# Patient Record
Sex: MALE | Race: WHITE
[De-identification: names, ages, dates, MRNs, and addresses within clinical notes are randomized per-mention and may not be internally consistent; named-entity substitution may affect disease eponyms.]

---

## 2022-04-13 ENCOUNTER — HOSPITAL ENCOUNTER (OUTPATIENT)
Dept: HOSPITAL 47 - RADCTMAIN | Age: 62
Discharge: HOME | End: 2022-04-13
Attending: UROLOGY
Payer: COMMERCIAL

## 2022-04-13 DIAGNOSIS — N28.89: ICD-10-CM

## 2022-04-13 DIAGNOSIS — N40.0: ICD-10-CM

## 2022-04-13 DIAGNOSIS — N28.1: Primary | ICD-10-CM

## 2022-04-13 PROCEDURE — 74400 UROGRAPHY IV +-KUB TOMOG: CPT

## 2022-04-13 PROCEDURE — 74178 CT ABD&PLV WO CNTR FLWD CNTR: CPT

## 2022-04-13 NOTE — CT
EXAMINATION TYPE: CT urogram wo/w con

 

DATE OF EXAM: 4/13/2022

 

INDICATION: hematuria

 

CT DLP: 1547.20 mGy.cm  Automated Exposure Control for Dose Reduction was Utilized.

 

TECHNIQUE AND CONTRAST: 

CT scan of the abdomen and pelvis is performed without and with IV Contrast, as per CT urogram protoc
ol. The patient was injected with 100 mL of Isovue 300. 3-D reconstruction images were generated on a
n independent workstation and reviewed.

 

COMPARISON: None available

 

FINDINGS:

No radiodense urinary calculi. No hydroureter or hydronephrosis. 15 mm cyst is seen at the upper pole
 of right kidney with internal septation/density within. Recommend follow-up CT scan in 6 months. Unr
emarkable kidneys otherwise. No definite filling defect within the renal collecting system or the ure
ters. No gross ureteric lesion.

 

Markedly enlarged heterogeneous prostate indenting and elevating the urinary bladder base, please cor
relate with PSA level. Focal nodule is seen at the left side of the urinary bladder base measuring 6 
x 8 mm which could represent a blood clot however urothelial lesion cannot be excluded. Recommend cor
relation with urinalysis results (including cytology) and cystoscopy. No other definite urinary bladd
er lesion.

 

A few millimetric hepatic hypodensities, possibly representing hepatic cysts. Nondistended gallbladde
r, possibly due to incomplete fasting. Unremarkable spleen and pancreas. Right adrenal lesion measuri
ng up to 2.3 cm likely representing an adrenal adenoma. Focal thickening of the left adrenal which co
uld also represent an adrenal adenoma. Scattered arterial atherosclerotic calcifications.

 

Unremarkable nondistended stomach, duodenum and small bowel. Wall thickening of the colonic splenic f
lexure and other segments of the colon, nonspecific, please correlate with colonoscopy results. No medel
spicious lymphadenopathy or sizable ascites. Unremarkable lung bases. Marked degenerative changes at 
L5-S1. No gross aggressive bone lesion.

 

IMPRESSION:

1. Right upper pole complex renal cyst as described above, for 6 months follow-up CT scan or further 
ultrasound assessment.

2. Markedly enlarged prostate, please correlate with PSA level.

3. Small nodule in the left urinary bladder base as described above, which could represent a blood cl
ot however urothelial lesion cannot be excluded, please correlate with urinalysis results (including 
cytology) and cystoscopy results. Other multiple incidental findings as detailed above.

## 2022-10-12 ENCOUNTER — HOSPITAL ENCOUNTER (OUTPATIENT)
Dept: HOSPITAL 47 - RADCTMAIN | Age: 62
Discharge: HOME | End: 2022-10-12
Attending: UROLOGY
Payer: COMMERCIAL

## 2022-10-12 DIAGNOSIS — N28.1: ICD-10-CM

## 2022-10-12 DIAGNOSIS — D41.01: Primary | ICD-10-CM

## 2022-10-12 LAB — BUN SERPL-SCNC: 24 MG/DL (ref 9–20)

## 2022-10-12 PROCEDURE — 36415 COLL VENOUS BLD VENIPUNCTURE: CPT

## 2022-10-12 PROCEDURE — 84520 ASSAY OF UREA NITROGEN: CPT

## 2022-10-12 PROCEDURE — 74170 CT ABD WO CNTRST FLWD CNTRST: CPT

## 2022-10-12 PROCEDURE — 82565 ASSAY OF CREATININE: CPT

## 2022-10-13 NOTE — CT
EXAMINATION TYPE: CT abdomen wo/w con

CT DLP: 1153 mGycm, Automated exposure control for dose reduction was used.

 

DATE OF EXAM: 10/12/2022 4:39 PM

 

COMPARISON: CT abdomen pelvis most recent from 4/13/2022

 

CLINICAL INDICATION:Male, 62 years old with history of D41.01; right renal mass

 

TECHNIQUE:  Axial CT of the abdomen. Sagittal and coronal reformats were created on a separate workst
ation. 

 

Contrast used:70 mL of Isovue 300 with IV Contrast, 

Oral contrast used: with Oral Contrast 

 

FINDINGS: 

LOWER CHEST: Chest wall intramuscular lipoma measuring up to 23 mm on the right.

 

ABDOMEN

LIVER: Unremarkable

GALLBLADDER AND BILE DUCTS: Unremarkable.

PANCREAS: Unremarkable.

SPLEEN: Unremarkable.

ADRENAL GLANDS: Unremarkable.

KIDNEYS AND URETERS: A cyst in question in the right upper kidney measures 14 mm which is similar to 
prior given differences in measuring technique. No significant change from prior. There remains a pos
sible thin internal septation. No additional solid masses definitively identified. No evidence of hyd
ronephrosis or renal calculus. 

 

PELVIS

BLADDER: Unremarkable

REPRODUCTIVE: Unremarkable.

 

ABDOMEN & PELVIS

STOMACH AND BOWEL: No evidence of bowel obstruction. 

PERITONEUM: No evidence of pneumoperitoneum or free fluid.

 

VASCULATURE: No evidence of aortic aneurysm. Atherosclerosis of the arterial vasculature.

MUSCULOSKELETAL: No acute osseous abnormalities, multilevel disc degeneration changes are seen throug
hout the spine most pronounced at L4-L5 with endplate spurring, vacuum disc phenomenon and endplate s
clerosis.

LYMPH NODES: No gross evidence for lymphadenopathy.

SOFT TISSUE/ABDOMINAL WALL: Unremarkable

 

IMPRESSION:

Stable right upper pole renal cyst with possible thin internal septation versus 2 adjacent cysts. Fol
low-up in 6 months is again recommended.

## 2023-04-22 ENCOUNTER — HOSPITAL ENCOUNTER (OUTPATIENT)
Dept: HOSPITAL 47 - RADMRIMAIN | Age: 63
Discharge: HOME | End: 2023-04-22
Attending: UROLOGY
Payer: COMMERCIAL

## 2023-04-22 DIAGNOSIS — N40.0: Primary | ICD-10-CM

## 2023-04-22 DIAGNOSIS — R97.20: ICD-10-CM

## 2023-04-22 PROCEDURE — 72197 MRI PELVIS W/O & W/DYE: CPT

## 2023-04-23 NOTE — MR
EXAMINATION TYPE: MR Prostate wo/w con

 

DATE OF EXAM: 4/22/2023 8:11 AM

 

COMPARISON: CT 12/12/2022.  

 

CLINICAL INDICATION:Male, 63 years old with history of R97.20 ELEVATED PROSTATE SPECIFIC ANTIGEN; 

 

TECHNIQUE: Multi-planar, multi-sequence imaging of the pelvis is performed prior to and following the
 uncomplicated administration of bolus intravenous gadolinium.

 

CONTRAST: 8.5 ml Gadavist

Interpretive Criteria: PI-RADS v2.1

 

SERUM PSA: 

6.8 1/20/2020

 

SURGICAL PATHOLOGY: No data available.

 

FINDINGS:

Prostatic dimensions: 6.0 x 5.8 x 4.0cm.

Ellipsoid Volume:72.88 (PSA density=0.09 ng/mL/mL)

 

 

CENTRAL GLAND (Central and Transition Zones/CZ+TZ):

Multiple bilateral, heterogenous appearing hypertrophic stromal nodules, without suspicious lesion. M
edian lobe hypertrophy with protrusion into the base of the bladder.  (PI-RADS 2) 

 

 

 

PERIPHERAL ZONE (PZ):

Bilateral linear, indistinct wedgelike areas of low ADC, and low T2 signal, No evidence of masslike a
bnormality, or localized perfusional hypervascularity, to further suggest a focus of clinically signi
ficant prostate cancer. (PI-RADS 2) 

 

SEMINAL VESICLES (SV):

Symmetric and unremarkable.

 

PERIPROSTATIC TISSUES:

Unremarkable. 

 

LYMPH NODES:

No enlarged pelvic lymph node.

 

REMAINING PELVIS:

Bladder wall is within normal limits given distention. 

No abnormal free or organized intrapelvic fluid collection.

Questionable polyp in the rectum series 501 image 12.

Sigmoid colonic diverticula

 

OSSEOUS STRUCTURES:

No suspicious osseous abnormality.

 

IMPRESSION:

1. No specific features for high-risk prostate cancer. Maximum PI-RADS score: 2.

 

2. Moderate BPH, estimated gland volume 72.88 mL.

 

4. Questionable rectal polyp versus stool correlate with direct visualization/colonoscopy.